# Patient Record
Sex: MALE | Race: WHITE
[De-identification: names, ages, dates, MRNs, and addresses within clinical notes are randomized per-mention and may not be internally consistent; named-entity substitution may affect disease eponyms.]

---

## 2018-09-25 ENCOUNTER — HOSPITAL ENCOUNTER (EMERGENCY)
Dept: HOSPITAL 80 - FED | Age: 23
Discharge: HOME | End: 2018-09-25
Payer: COMMERCIAL

## 2018-09-25 VITALS — DIASTOLIC BLOOD PRESSURE: 86 MMHG | SYSTOLIC BLOOD PRESSURE: 135 MMHG

## 2018-09-25 DIAGNOSIS — J45.909: Primary | ICD-10-CM

## 2018-09-25 NOTE — EDPHY
H & P


Stated Complaint: asthma exacerbation/has been off his dulera 8 months


Time Seen by Provider: 09/25/18 15:14


HPI/ROS: 





CHIEF COMPLAINT:  Cough, shortness of breath





HISTORY OF PRESENT ILLNESS:  22-year-old male with asthma presents with cough 

and shortness of breath.  Onset of wheezing several days ago, associated with a 

moist cough and gradually increasing shortness of breath.  Ran out of his 

inhaler several months ago.  Also has chronic pain in the left shoulder related 

to recurrent dislocations.  No URI symptoms or fever.





REVIEW OF SYSTEMS:  complete 10 point ROS reviewed and is negative except for 

the noted elements in the HPI








- Personal History


Current Tetanus Diphtheria and Acellular Pertussis (TDAP): Yes





- Medical/Surgical History


Hx Asthma: Yes


Hx Chronic Respiratory Disease: No


Hx Diabetes: No


Hx Cardiac Disease: No


Hx Renal Disease: No


Hx Cirrhosis: No


Hx Alcoholism: No


Hx HIV/AIDS: No


Hx Splenectomy or Spleen Trauma: No


Other PMH: asthma chronic l shoulder dislocation





- Social History


Smoking Status: Light smoker


Alcohol Use: Sober





- Physical Exam


Exam: 





General Appearance:  Alert, pleasant


Eyes:  Pupils equal and round, no conjunctival pallor or injection


ENT, Mouth:  Mucous membranes moist


Neck:  Normal inspection


Respiratory:  audible diffuse inspiratory and expiratory wheezing


Cardiovascular:  Regular rate and rhythm


Abdomen:  Soft and nontender


Neurological:  A&O, nonfocal, normal gait


Skin:  Warm and dry, no rash


Extremities:  Normal inspection


Psychiatric:  Mood and affect normal





Constitutional: 


 Initial Vital Signs











Temperature (C)  36.5 C   09/25/18 15:01


 


Heart Rate  103 H  09/25/18 15:01


 


Respiratory Rate  20   09/25/18 15:01


 


Blood Pressure  179/81 H  09/25/18 15:01


 


O2 Sat (%)  94   09/25/18 15:01








 











O2 Delivery Mode               Room Air














Allergies/Adverse Reactions: 


 





guaifenesin Allergy (Verified 09/25/18 15:00)


 








Home Medications: 














 Medication  Instructions  Recorded


 


Advair 100/50 (*)  09/25/18


 


Albuterol  09/25/18


 


Albuterol Sulfate [ALBUTEROL 1.25 mg IH Q6 PRN #30 09/25/18





SULFATE 1.25 MG/3 ML]  


 


Albuterol [Proventil Inhaler HFA 2 puffs IH QID PRN #1 mdi 09/25/18





(*)]  


 


predniSONE 1 tab PO DAILY #15 tab 09/25/18














Medical Decision Making


ED Course/Re-evaluation: 





Patient presents with an asthma exacerbation.  DuoNeb and prednisone given.  





After the duoneb, I reexamined the pt.  He actually has no wheezing when he 

breathes through his nose. He is making the sound of wheezing using his vocal 

cords.  c/w vocal cord dysfunction.  d/w pt, O2 sat 97% on RA.  Safe/stable for 

d/c home.  Pt relates that he was just seen at another hospital and is seeking 

narcotic pain medications for chronic shoulder pain.  He apparently already 

received prescriptions for albuterol and prednisone at the other hospital.  I 

informed him that we do not give narcotics for chronic pain.  I strongly 

encouraged him to follow up with primary care.





- Data Points


Medications Given: 


 








Discontinued Medications





Albuterol/Ipratropium (Duoneb)  3 ml IH EDNOW ONE


   Stop: 09/25/18 15:22


   Last Admin: 09/25/18 15:24 Dose:  3 ml


Prednisone (Prednisone)  60 mg PO EDNOW ONE


   Stop: 09/25/18 15:31


   Last Admin: 09/25/18 15:35 Dose:  60 mg








Departure





- Departure


Disposition: Home, Routine, Self-Care


Clinical Impression: 


Exacerbation of asthma


Qualifiers:


 Asthma severity: moderate Asthma persistence: unspecified Qualified Code(s): 

J45.901 - Unspecified asthma with (acute) exacerbation





Condition: Good


Instructions:  Bronchospasm (ED)


Referrals: 


Gary Nelson MD [Medical Doctor] - 2-3 days, if not improved (Call to make 

an appointment.)


Prescriptions: 


Albuterol [Proventil Inhaler HFA (*)] 2 puffs IH QID PRN #1 mdi


 PRN Reason: Short Of Breath/Dyspnea


Albuterol Sulfate [ALBUTEROL SULFATE 1.25 MG/3 ML] 1.25 mg IH Q6 PRN #30


 PRN Reason: Wheezing


predniSONE 1 tab PO DAILY #15 tab